# Patient Record
Sex: FEMALE | Race: WHITE
[De-identification: names, ages, dates, MRNs, and addresses within clinical notes are randomized per-mention and may not be internally consistent; named-entity substitution may affect disease eponyms.]

---

## 2017-05-01 NOTE — OR
DATE OF PROCEDURE:  05/01/2017

 

PREOPERATIVE DIAGNOSIS:  Dysphagia.

 

POSTOPERATIVE DIAGNOSES:  Dysphagia, etiology unknown.  Gastroesophageal reflux 

disease.

 

PROCEDURE PERFORMED:  Esophagogastrojejunostomy with biopsy of gastroesophageal 

junction.

 

ANESTHESIA:  IV anesthesia with monitored anesthesia care.

 

INDICATION:  This 67-year-old white female has had a Boogie-en-Y gastric bypass.  
She

complains of dysphagia.  She says she underwent upper endoscopy about 12 years 
ago.  I

counseled her for upper endoscopy with possible biopsy including risks and 
alternatives,

and she gave her informed consent to proceed.

 

DESCRIPTION OF PROCEDURE:  The patient was placed in the left lateral decubitus 
position.

IV anesthesia was administered by the Anesthesia Service.  Time-out was held.  
The flexible

video Olympus upper endoscope was passed through her mouth, down her esophagus, 
and into her

stomach remnant.  The scope was easily passed through the anastomosis into the 
jejunum, and

down several centimeter into the jejunum.  The scope was then slowly withdrawn 
examining the

mucosa throughout.  The jejunum appeared unremarkable.  The scope was brought 
up into the

gastric remnant, this appeared unremarkable.  The scope was brought up to the 
GE junction.

This was markedly abnormal with fingers of gastric mucosa going proximally up 
into the 

esophagus consistent with gastroesophageal reflux disease.  We obtained 
multiple totalling six

biopsies of the gastroesophageal junction.  The scope was then brought 
proximally up through

the remainder of the esophagus, which otherwise appeared unremarkable and it 
was removed.

She tolerated the procedure well.

 

 

 

 

Osbaldo Kong MD

DD:  05/01/2017 09:17:04

DT:  05/01/2017 13:47:14

Job #:  [id number]

DEANNA

## 2017-05-26 NOTE — EDM.PDOC
ED HPI GENERAL MEDICAL PROBLEM





- General


Chief Complaint: General


Stated Complaint: sweeling in the legs


Time Seen by Provider: 05/26/17 16:02


Source of Information: Reports: Patient, RN Notes Reviewed


History Limitations: Reports: No Limitations





- History of Present Illness


INITIAL COMMENTS - FREE TEXT/NARRATIVE: 





67-year-old female presents emergency department today sent over today from the 

clinic from her primary care provider she is concerned about some leg swelling 

and rash that has developed over her leg lower extremity on the right does have 

a history of DVT in the past.  Also complains of chest pain has been going on 

for one week denies any nausea vomiting or diaphoresis does get shortness of 

breath when she exerts herself


  ** Back


Pain Score (Numeric/FACES): 8





- Related Data


 Allergies











Allergy/AdvReac Type Severity Reaction Status Date / Time


 


acetaminophen Allergy  Cannot Verified 05/26/17 16:30





[From [employer]!cet-N 100]   Remember  


 


amitriptyline Allergy  Itching Verified 05/26/17 16:30


 


codeine Allergy  Hives Verified 05/26/17 16:30


 


propoxyphene napsylate Allergy  Cannot Verified 05/26/17 16:30





[From Glustert-N 100]   Remember  











Home Meds: 


 Home Meds





Methadone 20 mg PO TID PRN 12/12/13 [History]


Metoprolol Tartrate 150 mg PO BID 12/12/13 [History]


Multivitamin with Minerals [Multiple Vitamin] 1 tab PO DAILY 12/12/13 [History]


oxyCODONE HCl/Acetaminophen [Percocet 5-325 mg Tablet] 1 tab PO Q8HR PRN 12/12/ 13 [History]


Vitamin B Complex 100 mg PO DAILY 12/13/13 [History]


Cholecalciferol (Vitamin D3) [Vitamin D3] 5,000 unit PO DAILY 12/22/14 [History]


Citalopram Hydrobromide [Celexa] 40 mg PO DAILY 12/22/14 [History]


Isosorbide Mononitrate [Imdur] 30 mg PO DAILY 12/22/14 [History]


Nitroglycerin [Nitrostat] 0.4 mg PO DAILY PRN 12/22/14 [History]


Simvastatin [Zocor] 80 mg PO DAILY 12/22/14 [History]


tiZANidine HCl [Zanaflex] 8 mg PO BID PRN 12/22/14 [History]


Aspirin [Children's Aspirin] 81 mg PO DAILY 04/28/17 [History]


Pediatric Multivit #17/Iron [Children's Chewables] 1 tab PO BID 04/28/17 [

History]











Past Medical History


HEENT History: Reports: Impaired Vision, Other (See Below)


Other HEENT History: hystoplasmosis


Cardiovascular History: Reports: Blood Clots/VTE/DVT, CAD, Hypertension, MI, 

Prior Cardiac Arrest, Stents, Other (See Below)


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


Gastrointestinal History: Reports: GERD


Genitourinary History: Reports: Renal Disease


OB/GYN History: Reports: Pregnancy


Musculoskeletal History: Reports: Arthritis, Back Pain, Chronic, Neck Pain, 

Chronic


Psychiatric History: Reports: Anxiety


Endocrine/Metabolic History: Reports: Obesity/BMI 30+, Vitamin D Deficiency


Hematologic History: Reports: B12 Deficiency, Blood Transfusion(s), Folic Acid





- Infectious Disease History


Infectious Disease History: Reports: Chicken Pox, Measles, Mumps, Shingles





- Past Surgical History


Cardiovascular Surgical History: Reports: Coronary Artery Stent


Respiratory Surgical History: Reports: None


GI Surgical History: Reports: Appendectomy, Bariatric Procedure, Cholecystectomy

, Colonoscopy, EGD, Other (See Below)


Female  Surgical History: Reports: Tubal Ligation


Neurological Surgical History: Reports: C-Spine, Laminectomy


Musculoskeletal Surgical History: Reports: Carpal Tunnel





Social & Family History





- Tobacco Use


Smoking Status *Q: Former Smoker


Years of Tobacco use: 25


Packs/Tins Daily: 1


Used Tobacco, but Quit: Yes


Month Tobacco Last Used: June


Second Hand Smoke Exposure: No





- Caffeine Use


Caffeine Use: Reports: Coffee





- Alcohol Use


Days Per Week of Alcohol Use: 0





- Recreational Drug Use


Recreational Drug Use: No





ED ROS GENERAL





- Review of Systems


Review Of Systems: See Below


Constitutional: Reports: No Symptoms


HEENT: Reports: No Symptoms


Respiratory: Reports: Shortness of Breath


Cardiovascular: Reports: Chest Pain, Dyspnea on Exertion, Edema


GI/Abdominal: Reports: No Symptoms


: Reports: No Symptoms


Musculoskeletal: Reports: No Symptoms


Skin: Reports: Rash


Neurological: Reports: No Symptoms





ED EXAM, GENERAL





- Physical Exam


Exam: See Below


Exam Limited By: No Limitations


General Appearance: Alert, WD/WN, No Apparent Distress


Eye Exam: Bilateral Eye: Normal Inspection


Respiratory/Chest: No Respiratory Distress, Lungs Clear, Normal Breath Sounds, 

No Accessory Muscle Use


Cardiovascular: Regular Rate, Rhythm, No Murmur


Peripheral Pulses: 2+: Dorsalis Pedis (L), Dorsalis Pedis (R)


GI/Abdominal: Soft, Non-Tender


Skin Exam: Warm, Dry, Petechiae, Rash





Course





- Vital Signs


Last Recorded V/S: 


 Last Vital Signs











Temp  98.6 F   05/26/17 16:38


 


Pulse  70   05/26/17 18:09


 


Resp  16   05/26/17 16:38


 


BP  158/105 H  05/26/17 18:09


 


Pulse Ox  97   05/26/17 18:09














- Orders/Labs/Meds


Orders: 


 Active Orders 24 hr











 Category Date Time Status


 


 Cardiac Monitoring [RC] .As Directed Care  05/26/17 17:08 Active


 


 EKG Documentation Completion [RC] ASDIRECTED Care  05/26/17 17:08 Active


 


 VL Duplex Lwr Ext Veins Ltd Rt [US] Stat Exams  05/26/17 17:59 Ordered


 


 EKG 12 Lead [EK] Stat Ther  05/26/17 17:08 Ordered











Labs: 


 Laboratory Tests











  05/26/17 05/26/17 05/26/17 Range/Units





  17:07 17:07 17:07 


 


WBC  6.9    (4.5-11.0)  K/uL


 


RBC  3.68    (3.30-5.50)  M/uL


 


Hgb  11.1 L    (12.0-15.0)  g/dL


 


Hct  36.5    (36.0-48.0)  %


 


MCV  99 H    (80-98)  fL


 


MCH  30    (27-31)  pg


 


MCHC  30 L    (32-36)  %


 


Plt Count  235    (150-400)  K/uL


 


Neut % (Auto)  63    (36-66)  %


 


Lymph % (Auto)  26    (24-44)  %


 


Mono % (Auto)  9 H    (2-6)  %


 


Eos % (Auto)  1 L    (2-4)  %


 


Baso % (Auto)  0    (0-1)  %


 


D-Dimer, Quantitative   794 H   (0.0-400.0)  ng/mL


 


Sodium    142  (140-148)  mmol/L


 


Potassium    4.1  (3.6-5.2)  mmol/L


 


Chloride    105  (100-108)  mmol/L


 


Carbon Dioxide    29  (21-32)  mmol/L


 


Anion Gap    7.6  (5.0-14.0)  mmol/L


 


BUN    22 H  (7-18)  mg/dL


 


Creatinine    1.0  (0.6-1.0)  mg/dL


 


Est Cr Clr Drug Dosing    44.54  mL/min


 


Estimated GFR (MDRD)    55 L  (>60)  


 


Glucose    85  ()  mg/dL


 


Calcium    8.5  (8.5-10.1)  mg/dL


 


Total Bilirubin    0.3  (0.2-1.0)  mg/dL


 


AST    52 H D  (15-37)  U/L


 


ALT    28  (12-78)  U/L


 


Alkaline Phosphatase    86  ()  U/L


 


Troponin I    < 0.017  (0.000-0.056)  ng/mL


 


Total Protein    7.3  (6.4-8.2)  g/dL


 


Albumin    3.4  (3.4-5.0)  g/dL


 


Globulin    3.9 H  (2.3-3.5)  g/dL


 


Albumin/Globulin Ratio    0.9 L  (1.2-2.2)  














Departure





- Departure


Time of Disposition: 18:45


Disposition: Home, Self-Care 01


Condition: good


Clinical Impression: 


 Leg edema, Small vessel vasculitis








- Discharge Information


Forms:  ED Department Discharge


Additional Instructions: 


Trying followup with your primary care provider next week or, continue to use 

the compression system to reduce leg swelling





- My Orders


Last 24 Hours: 


My Active Orders





05/26/17 17:08


Cardiac Monitoring [RC] .As Directed 


EKG Documentation Completion [RC] ASDIRECTED 


EKG 12 Lead [EK] Stat 





05/26/17 17:59


VL Duplex Lwr Ext Veins Ltd Rt [US] Stat 














- Assessment/Plan


Last 24 Hours: 


My Active Orders





05/26/17 17:08


Cardiac Monitoring [RC] .As Directed 


EKG Documentation Completion [RC] ASDIRECTED 


EKG 12 Lead [EK] Stat 





05/26/17 17:59


VL Duplex Lwr Ext Veins Ltd Rt [US] Stat 











Plan: 





Assessment





Acuity = acute





Site and laterality = suspicious for small vessel vasculitis





Etiology  = possibly related to medication use





Manifestations = leg edema petechial rash





Location of injury =  home





Lab values = CBC, CMP within normal limits troponin was negative d-dimer 

elevated at 792 of unclear etiology, ultrasound of the right leg was negative 

for any DVT official read radiology pending





Plan


Discussed lab results and ultrasound results with her and suspicious this is 

related to recent medication use with her petechial type rash teddy COban 2 wrap 

for the next couple days to help reduce the edema have her followup with her 

primary care next week for reevaluation

















Patient was in agreement with the plan all questions were answered, they were 

instructed to return to the emergency department or call for worsening 

symptoms.  This note was dictated using dragon voice recognition software 

please call with any questions.

## 2017-05-30 NOTE — US
VL Duplex Lwr Ext Veins Ltd Rt 

 

FINDINGS: Ultrasound examination of the right lower extremity using Doppler and compressive techniqu
e demonstrates that the common femoral, femoral, and popliteal veins are patent, and negative for th
rombus. The calf veins were segmentally visualized and are negative where seen. 

 

IMPRESSION: Lower extremity negative for deep venous thrombosis.

## 2017-10-09 NOTE — OR
DATE OF PROCEDURE:  10/09/2017

 

PROCEDURE PERFORMED:  Colonoscopy.

 

FINDINGS:

1. Very mild diverticulosis.

2. External hemorrhoids, non-thrombosed.

3. No other gross abnormalities.

 

COMPLICATIONS:  None.

 

ASSISTANT:  None.

 

PREOPERATIVE DIAGNOSIS:  Screening colonoscopy.

 

POSTOPERATIVE DIAGNOSIS:  Screening colonoscopy.

 

RISKS:  Risks, benefits, alternatives, and limitations including but, not limited to

infection, bleeding, and perforation were explained to the patient and wished to proceed.

 

PROCEDURE IN DETAIL:  The patient was placed in left lateral decubitus position.  Digital

rectal exam was performed, which showed external hemorrhoids.  The scope was introduced and

advanced atraumatically to the ileocecal valve.  A photo was taken.  The scope was brought

back through the ascending, transverse, and descending colon and retroflexed.  The patient

had diverticulosis.  This was described as mild with only a few tics noted.  No old or new

blood.  No polyps.  No masses.  The prep was moderately acceptable with a moderate amount of

solid and liquid retained stool.  Despite this, approximately 90% of the luminal surface

could be seen.  No abnormalities on retroflexion.  The patient tolerated the procedure well.

 

 

 

 

Yo Del Toro MD

DD:  10/09/2017 08:49:49

DT:  10/09/2017 09:04:38

Job #:  [id number]

## 2019-04-10 NOTE — EDM.PDOC
ED HPI GENERAL MEDICAL PROBLEM





- General


Chief Complaint: Abdominal Pain


Stated Complaint: WEAKNESS  HEAD HURTS


Time Seen by Provider: 04/10/19 14:45





- History of Present Illness


INITIAL COMMENTS - FREE TEXT/NARRATIVE: 





69-year-old female said that she started noticing generalized weakness 

yesterday morning along with generalized muscle spasms. This morning she 

experienced some diaphoresis that resolved on its own. She continues to feel 

weak today. She was brought to the emergency room by her . She complains 

of dry mouth. She claims that she has been staying hydrated. She does have a 

history of cardiovascular disease and has deferred a cardiac arrest in the past 

and underwent stenting. Also has a history of hypertension and hyperlipidemia 

and has had a prior DVT.


  ** abdomen and both ankles


Pain Score (Numeric/FACES): 7





- Related Data


 Allergies











Allergy/AdvReac Type Severity Reaction Status Date / Time


 


acetaminophen Allergy  Cannot Verified 04/10/19 14:05





[From Darvocet-N 100]   Remember  


 


amitriptyline Allergy  Itching Verified 04/10/19 14:05


 


codeine Allergy  Hives Verified 04/10/19 14:05


 


propoxyphene napsylate Allergy  Cannot Verified 04/10/19 14:05





[From Darvocet-N 100]   Remember  











Home Meds: 


 Home Meds





Methadone 80 mg PO TID PRN 12/12/13 [History]


Metoprolol Tartrate 150 mg PO BID 12/12/13 [History]


oxyCODONE HCl/Acetaminophen [Percocet 5-325 mg Tablet] 1 tab PO QID 12/12/13 [

History]


Citalopram Hydrobromide [Celexa] 40 mg PO DAILY 12/22/14 [History]


Isosorbide Mononitrate [Imdur] 30 mg PO DAILY 12/22/14 [History]


Nitroglycerin [Nitrostat] 0.4 mg PO DAILY PRN 12/22/14 [History]


Simvastatin [Zocor] 80 mg PO DAILY 12/22/14 [History]


Aspirin [Children's Aspirin] 81 mg PO DAILY 04/28/17 [History]


Losartan [Cozaar] 25 mg PO DAILY 10/05/17 [History]


tiZANidine [Zanaflex] 8 mg PO BID PRN 10/11/18 [History]











Past Medical History


HEENT History: Reports: Impaired Vision, Other (See Below)


Other HEENT History: hystoplasmosis


Cardiovascular History: Reports: Blood Clots/VTE/DVT, CAD, High Cholesterol, 

Hypertension, MI, Prior Cardiac Arrest, Stents, Other (See Below)


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


Gastrointestinal History: Reports: GERD


Genitourinary History: Reports: Chronic Renal Insuffiency, Renal Disease


OB/GYN History: Reports: Pregnancy


Musculoskeletal History: Reports: Arthritis, Back Pain, Chronic, Neck Pain, 

Chronic


Neurological History: Reports: None


Psychiatric History: Reports: Anxiety


Endocrine/Metabolic History: Reports: Obesity/BMI 30+, Vitamin D Deficiency


Hematologic History: Reports: B12 Deficiency, Blood Transfusion(s), Folic Acid





- Infectious Disease History


Infectious Disease History: Reports: Chicken Pox, Measles, Mumps, Shingles





- Past Surgical History


HEENT Surgical History: Reports: Laser Surgery, Tonsillectomy


Cardiovascular Surgical History: Reports: Coronary Artery Stent


GI Surgical History: Reports: Appendectomy, Bariatric Procedure, Cholecystectomy

, Colonoscopy, EGD


Female  Surgical History: Reports: Tubal Ligation


Neurological Surgical History: Reports: C-Spine, Laminectomy


Musculoskeletal Surgical History: Reports: Arthroscopic Knee, Carpal Tunnel





Social & Family History





- Family History


Family Medical History: Noncontributory





- Tobacco Use


Smoking Status *Q: Never Smoker





- Caffeine Use


Caffeine Use: Reports: Coffee





- Recreational Drug Use


Recreational Drug Use: No





ED ROS GENERAL





- Review of Systems


Review Of Systems: See Below


Constitutional: Reports: Weakness, Fatigue, Diaphoresis.  Denies: Fever, Chills


HEENT: Reports: No Symptoms


Respiratory: Reports: No Symptoms


Cardiovascular: Reports: No Symptoms


Endocrine: Reports: Fatigue


GI/Abdominal: Reports: No Symptoms


: Reports: No Symptoms


Musculoskeletal: Reports: Other (Generalized muscular cramping mostly in the 

neck, abdomen, and ankles.)


Neurological: Reports: Weakness.  Denies: Confusion, Dizziness, Trouble Speaking





ED EXAM, RENAL/





- Physical Exam


Exam: See Below


Exam Limited By: No Limitations


General Appearance: Alert


Ears: Normal External Exam, Normal Canal, Hearing Grossly Normal, Normal TMs


Nose: Normal Inspection


Throat/Mouth: Other (Mouth slightly dry.)


Head: Normocephalic


Neck: Supple, Non-Tender


Respiratory/Chest: No Respiratory Distress, Lungs Clear


Cardiovascular: Normal Peripheral Pulses, Regular Rate, Rhythm, No Edema, No 

Murmur


GI/Abdominal: Normal Bowel Sounds, Soft, Non-Tender


 (Female) Exam: Other (No flank tenderness.)


Extremities: Normal Inspection


Neurological: Alert, Oriented





Course





- Vital Signs


Last Recorded V/S: 


 Last Vital Signs











Temp  35.2 C   04/10/19 14:11


 


Pulse  67   04/10/19 17:05


 


Resp  14   04/10/19 17:05


 


BP  144/50 H  04/10/19 17:05


 


Pulse Ox  97   04/10/19 17:05














- Orders/Labs/Meds


Orders: 


 Active Orders 24 hr











 Category Date Time Status


 


 EKG Documentation Completion [RC] ASDIRECTED Care  04/10/19 15:14 Active


 


 EKG 12 Lead [EK] Stat Ther  04/10/19 15:14 Ordered











Labs: 


 Laboratory Tests











  04/10/19 04/10/19 04/10/19 Range/Units





  15:31 15:31 15:31 


 


WBC  11.3 H    (4.5-11.0)  K/uL


 


RBC  3.41    (3.30-5.50)  M/uL


 


Hgb  10.2 L    (12.0-15.0)  g/dL


 


Hct  34.4 L    (36.0-48.0)  %


 


MCV  101 H    (80-98)  fL


 


MCH  30    (27-31)  pg


 


MCHC  30 L    (32-36)  %


 


Plt Count  171    (150-400)  K/uL


 


Neut % (Auto)  76 H    (36-66)  %


 


Lymph % (Auto)  15 L    (24-44)  %


 


Mono % (Auto)  9 H    (2-6)  %


 


Eos % (Auto)  1 L    (2-4)  %


 


Baso % (Auto)  0    (0-1)  %


 


Sodium   138 L   (140-148)  mmol/L


 


Potassium   4.2   (3.6-5.2)  mmol/L


 


Chloride   104   (100-108)  mmol/L


 


Carbon Dioxide   27   (21-32)  mmol/L


 


Anion Gap   11.2   (5.0-14.0)  mmol/L


 


BUN   22 H   (7-18)  mg/dL


 


Creatinine   1.5 H   (0.6-1.0)  mg/dL


 


Est Cr Clr Drug Dosing   28.87   mL/min


 


Estimated GFR (MDRD)   34 L   (>60)  


 


Glucose   91   ()  mg/dL


 


Lactic Acid    0.7  (0.4-2.0)  mmol/L


 


Calcium   8.6   (8.5-10.1)  mg/dL


 


Total Bilirubin   0.6  D   (0.2-1.0)  mg/dL


 


AST   15   (15-37)  U/L


 


ALT   20   (12-78)  U/L


 


Alkaline Phosphatase   75   ()  U/L


 


Troponin I   < 0.017   (0.000-0.056)  ng/mL


 


Total Protein   6.4   (6.4-8.2)  g/dL


 


Albumin   2.9 L   (3.4-5.0)  g/dL


 


Globulin   3.5   (2.3-3.5)  g/dL


 


Albumin/Globulin Ratio   0.8 L   (1.2-2.2)  


 


Urine Color     


 


Urine Appearance     


 


Urine pH     (4.5-8.0)  


 


Ur Specific Gravity     (1.008-1.030)  


 


Urine Protein     (NEGATIVE)  mg/dL


 


Urine Glucose (UA)     (NEGATIVE)  mg/dL


 


Urine Ketones     (NEGATIVE)  mg/dL


 


Urine Occult Blood     (NEGATIVE)  


 


Urine Nitrite     (NEGATIVE)  


 


Urine Bilirubin     (NEGATIVE)  


 


Urine Urobilinogen     (NORMAL)  mg/dL


 


Ur Leukocyte Esterase     (NEGATIVE)  


 


Urine RBC     (0-5)  


 


Urine WBC     (0-5)  


 


Ur Epithelial Cells     


 


Amorphous Sediment     


 


Urine Bacteria     


 


Urine Mucus     














  04/10/19 Range/Units





  16:07 


 


WBC   (4.5-11.0)  K/uL


 


RBC   (3.30-5.50)  M/uL


 


Hgb   (12.0-15.0)  g/dL


 


Hct   (36.0-48.0)  %


 


MCV   (80-98)  fL


 


MCH   (27-31)  pg


 


MCHC   (32-36)  %


 


Plt Count   (150-400)  K/uL


 


Neut % (Auto)   (36-66)  %


 


Lymph % (Auto)   (24-44)  %


 


Mono % (Auto)   (2-6)  %


 


Eos % (Auto)   (2-4)  %


 


Baso % (Auto)   (0-1)  %


 


Sodium   (140-148)  mmol/L


 


Potassium   (3.6-5.2)  mmol/L


 


Chloride   (100-108)  mmol/L


 


Carbon Dioxide   (21-32)  mmol/L


 


Anion Gap   (5.0-14.0)  mmol/L


 


BUN   (7-18)  mg/dL


 


Creatinine   (0.6-1.0)  mg/dL


 


Est Cr Clr Drug Dosing   mL/min


 


Estimated GFR (MDRD)   (>60)  


 


Glucose   ()  mg/dL


 


Lactic Acid   (0.4-2.0)  mmol/L


 


Calcium   (8.5-10.1)  mg/dL


 


Total Bilirubin   (0.2-1.0)  mg/dL


 


AST   (15-37)  U/L


 


ALT   (12-78)  U/L


 


Alkaline Phosphatase   ()  U/L


 


Troponin I   (0.000-0.056)  ng/mL


 


Total Protein   (6.4-8.2)  g/dL


 


Albumin   (3.4-5.0)  g/dL


 


Globulin   (2.3-3.5)  g/dL


 


Albumin/Globulin Ratio   (1.2-2.2)  


 


Urine Color  Yellow  


 


Urine Appearance  Clear  


 


Urine pH  5.0  (4.5-8.0)  


 


Ur Specific Gravity  1.015  (1.008-1.030)  


 


Urine Protein  Negative  (NEGATIVE)  mg/dL


 


Urine Glucose (UA)  Normal  (NEGATIVE)  mg/dL


 


Urine Ketones  Negative  (NEGATIVE)  mg/dL


 


Urine Occult Blood  Negative  (NEGATIVE)  


 


Urine Nitrite  Negative  (NEGATIVE)  


 


Urine Bilirubin  Negative  (NEGATIVE)  


 


Urine Urobilinogen  Normal  (NORMAL)  mg/dL


 


Ur Leukocyte Esterase  Small  (NEGATIVE)  


 


Urine RBC  0-5  (0-5)  


 


Urine WBC  10-20 H  (0-5)  


 


Ur Epithelial Cells  Moderate  


 


Amorphous Sediment  Not seen  


 


Urine Bacteria  Many  


 


Urine Mucus  Few  











Meds: 


Medications














Discontinued Medications














Generic Name Dose Route Start Last Admin





  Trade Name Freq  PRN Reason Stop Dose Admin


 


Sodium Chloride  1,000 mls @ 500 mls/hr  04/10/19 15:10  04/10/19 15:28





  Normal Saline  IV  04/10/19 17:09  500 mls/hr





  .BOLUS STA   Administration





     





     





     





     


 


Ketorolac Tromethamine  30 mg  04/10/19 17:23  04/10/19 17:27





  Toradol  IVPUSH  04/10/19 17:24  30 mg





  ONETIME ONE   Administration





     





     





     





     














Departure





- Departure


Time of Disposition: 18:00


Disposition: Home, Self-Care 01


Condition: Good


Clinical Impression: 


 Dehydration, Headache








- Discharge Information


Instructions:  Dehydration, Adult, Easy-to-Read


Referrals: 


Geo Sharp PA [Primary Care Provider] - 


Forms:  ED Department Discharge


Additional Instructions: 


Ibuprofen if needed for headache. Follow-up if severe headache or return severe 

weakness. Follow-up with primary provider if still having any symptoms after 2 

days.





- My Orders


Last 24 Hours: 


My Active Orders





04/10/19 15:14


EKG Documentation Completion [RC] ASDIRECTED 


EKG 12 Lead [EK] Stat 














- Assessment/Plan


Last 24 Hours: 


My Active Orders





04/10/19 15:14


EKG Documentation Completion [RC] ASDIRECTED 


EKG 12 Lead [EK] Stat 











Assessment:: 





Because of her low blood pressure I suspected dehydration and she was given 1 L 

of normal saline which brought her blood pressure back up and her symptoms of 

cramping resolved. She felt much stronger. She complained of some headache and 

was given Toradol 30 mg IV which resolved her headache.





Assessment: Dehydration. Also had a sinus headache which resolved with Toradol.

## 2019-04-22 NOTE — CRLCT
INDICATION:



Left leg numbness.



COMPARISON:



None.



TECHNIQUE:



Axial CT of the head without contrast.



FINDINGS:



Mild generalized volume loss. Patchy low-attenuation change within the 

white matter consistent with chronic small vessel ischemic changes. No 

acute intracranial hemorrhage, acute infarct, mass effect, or fracture. No 

midline shift. No abnormal ventricular dilatation. Normal calvarium and 

skull base. Visualized paranasal sinuses and mastoid air cells are clear. 

Intracranial carotid calcifications.



IMPRESSION:



1. No acute intracranial abnormality.



2. Mild generalized cerebral volume loss. Chronic deep white matter small 

vessel ischemic changes



Dictated by Clayton Mcconnell MD @ 4/22/2019 5:50:30 PM



Please note that all CT scans at this facility use dose modulation, 

iterative reconstruction, and/or weight-based dosing when appropriate to 

reduce radiation dose to as low as reasonably achievable.



Dictated by: Clayton Mcconnell MD @ 04/22/2019 17:50:44



(Electronically Signed)

## 2019-04-22 NOTE — EDM.PDOC
ED HPI GENERAL MEDICAL PROBLEM





- General


Chief Complaint: Neurological Problem


Stated Complaint: ARM AND LEG PAIN;NUMBNESS


Time Seen by Provider: 04/22/19 16:45


Source of Information: Reports: Patient


History Limitations: Reports: No Limitations





- History of Present Illness


INITIAL COMMENTS - FREE TEXT/NARRATIVE: 





69-year-old female who woke up this morning with right leg numbness from her 

mid thigh to her toes on the right side. There is no weakness, it feels funny 

when she is walking because of the numbness but she doesn't feel weak. She had 

a slight perceived lack of coordination of her right arm which scared her so 

she came in to be seen. No facial symptoms, visual changes, she is legally 

blind but that is stable. No speech deficits, shortness of breath, chest pain 

or palpitations. Now that she is here she still has numbness in her leg but no 

other symptom. No rashes or pain.


Onset: Unknown/Unsure


Location: Reports: Lower Extremity, Right


Associated Symptoms: Reports: Other (Brief perceived lack of coordination of 

the right arm which apparently is resolved)





- Related Data


 Allergies











Allergy/AdvReac Type Severity Reaction Status Date / Time


 


acetaminophen Allergy  Cannot Verified 04/22/19 16:36





[From Darvocet-N 100]   Remember  


 


amitriptyline Allergy  Itching Verified 04/22/19 16:36


 


codeine Allergy  Hives Verified 04/22/19 16:36


 


propoxyphene napsylate Allergy  Cannot Verified 04/22/19 16:36





[From Darvocet-N 100]   Remember  











Home Meds: 


 Home Meds





Methadone 80 mg PO TID PRN 12/12/13 [History]


Metoprolol Tartrate 150 mg PO BID 12/12/13 [History]


oxyCODONE HCl/Acetaminophen [Percocet 5-325 mg Tablet] 1 tab PO QID 12/12/13 [

History]


Citalopram Hydrobromide [Celexa] 40 mg PO DAILY 12/22/14 [History]


Isosorbide Mononitrate [Imdur] 30 mg PO DAILY 12/22/14 [History]


Nitroglycerin [Nitrostat] 0.4 mg PO DAILY PRN 12/22/14 [History]


Simvastatin [Zocor] 80 mg PO DAILY 12/22/14 [History]


Aspirin [Children's Aspirin] 81 mg PO DAILY 04/28/17 [History]


Losartan [Cozaar] 25 mg PO DAILY 10/05/17 [History]


tiZANidine [Zanaflex] 8 mg PO BID PRN 10/11/18 [History]











Past Medical History


HEENT History: Reports: Impaired Vision, Other (See Below)


Other HEENT History: hystoplasmosis


Cardiovascular History: Reports: Blood Clots/VTE/DVT, CAD, High Cholesterol, 

Hypertension, MI, Prior Cardiac Arrest, Stents, Other (See Below)


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


Gastrointestinal History: Reports: GERD


Genitourinary History: Reports: Chronic Renal Insuffiency, Renal Disease


OB/GYN History: Reports: Pregnancy


Musculoskeletal History: Reports: Arthritis, Back Pain, Chronic, Neck Pain, 

Chronic


Neurological History: Reports: None


Psychiatric History: Reports: Anxiety


Endocrine/Metabolic History: Reports: Obesity/BMI 30+, Vitamin D Deficiency


Hematologic History: Reports: B12 Deficiency, Blood Transfusion(s), Folic Acid





- Infectious Disease History


Infectious Disease History: Reports: Chicken Pox, Measles, Mumps, Shingles





- Past Surgical History


HEENT Surgical History: Reports: Laser Surgery, Tonsillectomy


Cardiovascular Surgical History: Reports: Coronary Artery Stent


Respiratory Surgical History: Reports: None


GI Surgical History: Reports: Appendectomy, Bariatric Procedure, Cholecystectomy

, Colonoscopy, EGD


Female  Surgical History: Reports: Tubal Ligation


Neurological Surgical History: Reports: C-Spine, Laminectomy


Musculoskeletal Surgical History: Reports: Arthroscopic Knee, Carpal Tunnel


Dermatological Surgical History: Reports: None





Social & Family History





- Family History


Family Medical History: Noncontributory





- Tobacco Use


Smoking Status *Q: Never Smoker





- Caffeine Use


Caffeine Use: Reports: Coffee





- Recreational Drug Use


Recreational Drug Use: No





ED ROS GENERAL





- Review of Systems


Review Of Systems: See Below


Constitutional: Denies: Fever, Chills


HEENT: Reports: No Symptoms


Respiratory: Denies: Shortness of Breath, Cough


Cardiovascular: Denies: Chest Pain


GI/Abdominal: Denies: Abdominal Pain, Nausea, Vomiting


: Reports: No Symptoms


Skin: Reports: No Symptoms.  Denies: Rash


Neurological: Reports: Paresthesia (Numbness of the right leg).  Denies: 

Headache


Psychiatric: Reports: No Symptoms





ED EXAM, NEURO





- Physical Exam


Exam: See Below


Exam Limited By: No Limitations


General Appearance: Alert, No Apparent Distress


Eye Exam: Bilateral Eye: EOMI


Throat/Mouth: Normal Inspection


Head Exam: Atraumatic


Neck: Normal Inspection


Respiratory/Chest: No Respiratory Distress, Lungs Clear


Cardiovascular: Regular Rate, Rhythm


Neurological: Alert, Normal Mood/Affect, No Motor/Sensory Deficits (I could not 

reproduce any objective weakness of the extremities or face. Tactile sensation 

was perceived as less on the lateral right lower leg compared to the left.)


Skin Exam: Warm, Dry





Course





- Vital Signs


Last Recorded V/S: 


 Last Vital Signs











Temp  96.8 F   04/22/19 16:37


 


Pulse  64   04/22/19 16:37


 


Resp  11 L  04/22/19 16:37


 


BP  190/88 H  04/22/19 16:37


 


Pulse Ox  97   04/22/19 16:37














- Re-Assessments/Exams


Free Text/Narrative Re-Assessment/Exam: 





04/22/19 18:44


CT of the head was negative. Discussed possibilities other than stroke such as 

peripheral nerve impingement or numbness which deserves a day or two of 

observation. She will return in 48 hours if no improvement, and sooner if 

worsening.





Departure





- Departure


Time of Disposition: 18:45


Disposition: Home, Self-Care 01


Condition: Good


Clinical Impression: 


 Paresthesia of right leg








- Discharge Information


Instructions:  Paresthesia


Referrals: 


PCP,Unknown [Primary Care Provider] - 


Forms:  ED Department Discharge


Care Plan Goals: 


No medication changes, increase activity as tolerated. Recheck in 2-3 days if 

not improving, or return sooner if worsening as discussed.

## 2020-03-25 ENCOUNTER — HOSPITAL ENCOUNTER (EMERGENCY)
Dept: HOSPITAL 11 - JP.ED | Age: 70
Discharge: HOME | End: 2020-03-25
Payer: MEDICARE

## 2020-03-25 VITALS — SYSTOLIC BLOOD PRESSURE: 183 MMHG | DIASTOLIC BLOOD PRESSURE: 80 MMHG | HEART RATE: 85 BPM

## 2020-03-25 DIAGNOSIS — M19.90: ICD-10-CM

## 2020-03-25 DIAGNOSIS — E78.00: ICD-10-CM

## 2020-03-25 DIAGNOSIS — I25.2: ICD-10-CM

## 2020-03-25 DIAGNOSIS — K21.9: ICD-10-CM

## 2020-03-25 DIAGNOSIS — Z79.899: ICD-10-CM

## 2020-03-25 DIAGNOSIS — I25.10: ICD-10-CM

## 2020-03-25 DIAGNOSIS — E66.9: ICD-10-CM

## 2020-03-25 DIAGNOSIS — Z87.891: ICD-10-CM

## 2020-03-25 DIAGNOSIS — R60.0: Primary | ICD-10-CM

## 2020-03-25 DIAGNOSIS — Z88.5: ICD-10-CM

## 2020-03-25 DIAGNOSIS — Z86.718: ICD-10-CM

## 2020-03-25 DIAGNOSIS — I12.9: ICD-10-CM

## 2020-03-25 DIAGNOSIS — Z88.8: ICD-10-CM

## 2020-03-25 DIAGNOSIS — N18.9: ICD-10-CM

## 2020-03-25 DIAGNOSIS — F41.9: ICD-10-CM

## 2020-03-25 PROCEDURE — 99283 EMERGENCY DEPT VISIT LOW MDM: CPT

## 2020-03-25 PROCEDURE — 80048 BASIC METABOLIC PNL TOTAL CA: CPT

## 2020-03-25 PROCEDURE — 36415 COLL VENOUS BLD VENIPUNCTURE: CPT

## 2020-03-25 NOTE — EDM.PDOC
ED HPI GENERAL MEDICAL PROBLEM





- General


Chief Complaint: Lower Extremity Injury/Pain


Stated Complaint: LEG EDEMA, SORE ON RIGHT LEG


Time Seen by Provider: 03/25/20 18:00


Source of Information: Reports: Patient, Old Records


History Limitations: Reports: No Limitations





- History of Present Illness


INITIAL COMMENTS - FREE TEXT/NARRATIVE: 





68 yo female presents with bilateral lower leg swelling for at least a month. 

This got worse after she was put on amlodipine. Says that she has been 

elevating the legs and avoiding salt. Her BP continues to run high. Called 

regarding some weeping from the right leg today and was told to come to the ER. 

Denies SOB or orthopnea. Has mild renal failure and will be seeing a 

nephrologist in June. Can't tell me when she last had renal function testing 

done. 


Onset: Gradual


Duration: Week(s):, Getting Worse


Location: Reports: Lower Extremity, Left, Lower Extremity, Right


Quality: Reports: Ache


Severity: Mild


Improves with: Reports: None


Worsens with: Reports: Other (time)


Context: Reports: Other (See HPI)


Associated Symptoms: Reports: No Other Symptoms


Treatments PTA: Reports: Other (see below) (See HPI)


  ** Bilateral Lower Leg


Pain Score (Numeric/FACES): 6





- Related Data


 Allergies











Allergy/AdvReac Type Severity Reaction Status Date / Time


 


acetaminophen Allergy  Cannot Verified 03/25/20 17:51





[From Darvocet-N 100]   Remember  


 


amitriptyline Allergy  Itching Verified 03/25/20 17:51


 


codeine Allergy  Hives Verified 03/25/20 17:51


 


propoxyphene napsylate Allergy  Cannot Verified 03/25/20 17:51





[From Darvocet-N 100]   Remember  











Home Meds: 


 Home Meds





Methadone 80 mg PO TID PRN 12/12/13 [History]


oxyCODONE HCl/Acetaminophen [Percocet 5-325 mg Tablet] 1 tab PO QID 12/12/13 [

History]


Citalopram Hydrobromide [Celexa] 40 mg PO DAILY 12/22/14 [History]


Isosorbide Mononitrate [Imdur] 30 mg PO DAILY 12/22/14 [History]


Nitroglycerin [Nitrostat] 0.4 mg PO DAILY PRN 12/22/14 [History]


Simvastatin [Zocor] 80 mg PO DAILY 12/22/14 [History]


Losartan [Cozaar] 25 mg PO DAILY 10/05/17 [History]


tiZANidine [Zanaflex] 8 mg PO BID PRN 10/11/18 [History]


Warfarin Sodium 3 mg PO ASDIRECTED 03/25/20 [History]


amLODIPine Besylate [Amlodipine Besylate] 5 mg PO DAILY 03/25/20 [History]


hydroCHLOROthiazide [Hydrochlorothiazide] 12.5 mg PO QAM #14 cap 03/25/20 [Rx]











Past Medical History


HEENT History: Reports: Impaired Vision, Other (See Below)


Other HEENT History: hystoplasmosis


Cardiovascular History: Reports: Blood Clots/VTE/DVT, CAD, High Cholesterol, 

Hypertension, MI, Prior Cardiac Arrest, Stents, Other (See Below)


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


Gastrointestinal History: Reports: GERD


Genitourinary History: Reports: Chronic Renal Insuffiency, Renal Disease


OB/GYN History: Reports: Pregnancy


Musculoskeletal History: Reports: Arthritis, Back Pain, Chronic, Neck Pain, 

Chronic


Neurological History: Reports: None


Psychiatric History: Reports: Anxiety


Endocrine/Metabolic History: Reports: Obesity/BMI 30+, Vitamin D Deficiency


Hematologic History: Reports: B12 Deficiency, Blood Transfusion(s), Folic Acid





- Infectious Disease History


Infectious Disease History: Reports: Chicken Pox, Shingles





- Past Surgical History


HEENT Surgical History: Reports: Laser Surgery, Tonsillectomy


Cardiovascular Surgical History: Reports: Coronary Artery Stent


Respiratory Surgical History: Reports: None


GI Surgical History: Reports: Appendectomy, Bariatric Procedure, Cholecystectomy

, Colonoscopy, EGD


Female  Surgical History: Reports: Tubal Ligation


Neurological Surgical History: Reports: C-Spine, Laminectomy


Musculoskeletal Surgical History: Reports: Arthroscopic Knee, Carpal Tunnel


Dermatological Surgical History: Reports: None





Social & Family History





- Family History


Family Medical History: Noncontributory





- Tobacco Use


Smoking Status *Q: Former Smoker


Used Tobacco, but Quit: Yes


Month/Year Tobacco Last Used: 7 years ago





- Caffeine Use


Caffeine Use: Reports: None





- Recreational Drug Use


Recreational Drug Use: No





Review of Systems





- Review of Systems


Review Of Systems: See Below


Constitutional: Reports: No Symptoms


Respiratory: Reports: No Symptoms


Cardiovascular: Reports: Edema (of both legs below the knees.)


Musculoskeletal: Reports: No Symptoms


Skin: Reports: Other (weeping from a small break in the skin of the R leg below 

the knee. )


Neurological: Reports: No Symptoms





ED EXAM, GENERAL





- Physical Exam


Exam: See Below


Exam Limited By: No Limitations


General Appearance: Alert, WD/WN, No Apparent Distress


Eye Exam: Bilateral Eye: Normal Inspection


Ears: Normal External Exam, Normal Canal, Hearing Grossly Normal


Ear Exam: Bilateral Ear: Auricle Normal, Canal Normal


Nose: Normal Inspection, No Blood


Throat/Mouth: Normal Inspection, Normal Lips, Normal Oropharynx, Normal Voice, 

No Airway Compromise


Head: Atraumatic, Normocephalic


Neck: Normal Inspection


Respiratory/Chest: No Respiratory Distress, Lungs Clear, Normal Breath Sounds, 

No Accessory Muscle Use


Cardiovascular: Regular Rate, Rhythm.  No: No Edema


Extremities: Normal Range of Motion, Non-Tender, Pedal Edema (bilaterally below 

the knee, 1+ pitting).  No: No Pedal Edema


Neurological: Alert, Oriented, CN II-XII Intact, Normal Cognition, No Motor/

Sensory Deficits


Psychiatric: Normal Affect, Normal Mood


Skin Exam: Warm, Dry, Normal Color, No Rash, Other (R leg is just slightly more 

swollen than the left and there is a mid anterior shin small break in the skin 

with serous drainage. )





Course





- Vital Signs


Last Recorded V/S: 


 Last Vital Signs











Temp  36.0 C L  03/25/20 17:47


 


Pulse  85   03/25/20 17:47


 


Resp  16   03/25/20 17:47


 


BP  183/80 H  03/25/20 17:47


 


Pulse Ox  97   03/25/20 17:47














- Orders/Labs/Meds


Orders: 


 Active Orders 24 hr











 Category Date Time Status


 


 hydroCHLOROthiazide Med  03/25/20 19:00 Ordered





 12.5 mg PO DAILY   








 Medication Orders





Hydrochlorothiazide (Hydrochlorothiazide)  12.5 mg PO DAILY ADRIAN








Labs: 


 Laboratory Tests











  03/25/20 Range/Units





  18:18 


 


Sodium  138 L  (140-148)  mmol/L


 


Potassium  4.4  (3.6-5.2)  mmol/L


 


Chloride  103  (100-108)  mmol/L


 


Carbon Dioxide  25  (21-32)  mmol/L


 


Anion Gap  14.4 H  (5.0-14.0)  mmol/L


 


BUN  29 H  (7-18)  mg/dL


 


Creatinine  1.1 H  (0.6-1.0)  mg/dL


 


Est Cr Clr Drug Dosing  39.37  mL/min


 


Estimated GFR (MDRD)  49 L  (>60)  


 


Glucose  107 H  ()  mg/dL


 


Calcium  8.6  (8.5-10.1)  mg/dL











Meds: 


Medications











Generic Name Dose Route Start Last Admin





  Trade Name Nicky  PRN Reason Stop Dose Admin


 


Hydrochlorothiazide  12.5 mg  03/25/20 19:00  





  Hydrochlorothiazide  PO   





  DAILY [facility]   





     





     





     





     














Departure





- Departure


Time of Disposition: 19:00


Disposition: Home, Self-Care 01


Condition: Good


Clinical Impression: 


 Dependent edema





HTN (hypertension)


Qualifiers:


 Hypertension type: unspecified Qualified Code(s): I10 - Essential (primary) 

hypertension








- Discharge Information


*PRESCRIPTION DRUG MONITORING PROGRAM REVIEWED*: No


*COPY OF PRESCRIPTION DRUG MONITORING REPORT IN PATIENT TRUPTI: No


Prescriptions: 


hydroCHLOROthiazide [Hydrochlorothiazide] 12.5 mg PO QAM #14 cap


Instructions:  Edema


Referrals: 


Alexandria Garduno PA-C [Primary Care Provider] - 


Forms:  ED Department Discharge


Additional Instructions: 


Avoid salt or salty foods. Add HCTZ 12.5 mg every morning to your current 

medications. F/U with your provider in the next 2 weeks for recheck. Elevate 

your legs above your heart as much as possible and consider wearing support 

stockings. 





Sepsis Event Note





- Evaluation


Sepsis Screening Result: No Definite Risk





- Focused Exam


Vital Signs: 


 Vital Signs











  Temp Pulse Resp BP Pulse Ox


 


 03/25/20 17:47  36.0 C L  85  16  183/80 H  97


 


 03/25/20 17:44  36.0 C L  85  16  183/80 H  97











Date Exam was Performed: 03/25/20


Time Exam was Performed: 18:50





- My Orders


Last 24 Hours: 


My Active Orders





03/25/20 19:00


hydroCHLOROthiazide   12.5 mg PO DAILY 














- Assessment/Plan


Last 24 Hours: 


My Active Orders





03/25/20 19:00


hydroCHLOROthiazide   12.5 mg PO DAILY

## 2022-06-13 ENCOUNTER — HOSPITAL ENCOUNTER (EMERGENCY)
Dept: HOSPITAL 11 - JP.ED | Age: 72
Discharge: HOME | End: 2022-06-13
Payer: MEDICARE

## 2022-06-13 VITALS — DIASTOLIC BLOOD PRESSURE: 54 MMHG | SYSTOLIC BLOOD PRESSURE: 148 MMHG | HEART RATE: 62 BPM

## 2022-06-13 DIAGNOSIS — E66.9: ICD-10-CM

## 2022-06-13 DIAGNOSIS — Z88.5: ICD-10-CM

## 2022-06-13 DIAGNOSIS — Z88.8: ICD-10-CM

## 2022-06-13 DIAGNOSIS — N18.9: ICD-10-CM

## 2022-06-13 DIAGNOSIS — I12.9: ICD-10-CM

## 2022-06-13 DIAGNOSIS — Z79.01: ICD-10-CM

## 2022-06-13 DIAGNOSIS — Z95.5: ICD-10-CM

## 2022-06-13 DIAGNOSIS — E78.00: ICD-10-CM

## 2022-06-13 DIAGNOSIS — I25.10: ICD-10-CM

## 2022-06-13 DIAGNOSIS — Z79.899: ICD-10-CM

## 2022-06-13 DIAGNOSIS — R20.0: Primary | ICD-10-CM

## 2022-06-13 DIAGNOSIS — I25.2: ICD-10-CM

## 2022-06-13 DIAGNOSIS — K21.9: ICD-10-CM
